# Patient Record
Sex: FEMALE | Race: WHITE
[De-identification: names, ages, dates, MRNs, and addresses within clinical notes are randomized per-mention and may not be internally consistent; named-entity substitution may affect disease eponyms.]

---
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
## 2021-05-18 ENCOUNTER — HOSPITAL ENCOUNTER (OUTPATIENT)
Dept: HOSPITAL 47 - RADXRMAIN | Age: 21
Discharge: HOME | End: 2021-05-18
Attending: NURSE PRACTITIONER
Payer: COMMERCIAL

## 2021-05-18 DIAGNOSIS — M54.9: Primary | ICD-10-CM

## 2021-05-18 PROCEDURE — 72100 X-RAY EXAM L-S SPINE 2/3 VWS: CPT

## 2021-05-18 NOTE — XR
Lumbar spine

 

HISTORY: Back pain

 

3 views lumbar spine, correlation CT scan 4/5/2019

 

Lumbar vertebral bodies show preserved height, alignment, and bone mineralization. Disc spaces are ma
intained. No evident paraspinal mass.

 

IMPRESSION: Normal lumbar spine.

## 2022-08-23 ENCOUNTER — HOSPITAL ENCOUNTER (OUTPATIENT)
Dept: HOSPITAL 47 - FBPOP | Age: 22
Discharge: HOME | End: 2022-08-23
Attending: OBSTETRICS & GYNECOLOGY
Payer: COMMERCIAL

## 2022-08-23 VITALS
TEMPERATURE: 98.8 F | RESPIRATION RATE: 16 BRPM | SYSTOLIC BLOOD PRESSURE: 139 MMHG | HEART RATE: 99 BPM | DIASTOLIC BLOOD PRESSURE: 87 MMHG

## 2022-08-23 DIAGNOSIS — Z3A.34: ICD-10-CM

## 2022-08-23 DIAGNOSIS — O47.03: Primary | ICD-10-CM

## 2022-08-23 LAB
PH UR: 7 [PH] (ref 5–8)
SP GR UR: 1 (ref 1–1.03)
UROBILINOGEN UR QL STRIP: <2 MG/DL (ref ?–2)

## 2022-08-23 PROCEDURE — 99213 OFFICE O/P EST LOW 20 MIN: CPT

## 2022-08-23 PROCEDURE — 59025 FETAL NON-STRESS TEST: CPT

## 2022-08-23 PROCEDURE — 81003 URINALYSIS AUTO W/O SCOPE: CPT

## 2022-08-30 NOTE — P.MSEPDOC
Presenting Problems





- Arrival Data


Date of Arrival on Unit: 22


Time of Arrival on Unit: 11:41


Mode of Transport: Ambulatory





- Complaint


OB-Reason for Admission/Chief Complaint: Other


Comment: Pt reports pink tinged bleeding that started on .





Prenatal Medical History





- Pregnancy Information


: 3


Para: 0


Term: 0


: 0


Abortions: Spontaneous or Elective: 0


Number of Living Children: 0





- Gestational Age


Gestational Age by SAMUEL (wks/days): 34 Weeks and 5 Days





Review of Systems





- Review of Systems


Constitutional: No problems


Breast: No problems


ENT: No problems


Cardiovascular: No problems


Respiratory: No problems


Gastrointestinal: No problems


Genitourinary: No problems


Musculoskeletal: No problems


Neurological: No problems


Skin: No problems





Vital Signs





- Temperature


Temperature: 98.8 F


Temperature Source: Oral





- Pulse


  ** Right Brachial


Pulse Rate: 99


Pulse Assessment Method: Automatic Cuff





- Respirations


Respiratory Rate: 16


Oxygen Delivery Method: Room Air


O2 Sat by Pulse Oximetry: 100





- Blood Pressure


  ** Right Arm


Blood Pressure: 139/87


Blood Pressure Mean: 104


Blood Pressure Source: Automatic Cuff





Medical Screen Scoring





- Fetal Assessment - Baby A


Baseline FHR: 155


Fetal Heart Rate - NICHD Category: Category I (Normal)


NST: Reactive





Physician Notification





- Physician Notified


Physician Notified Date: 22


Physician Notified Time: 12:48


Physician: Irene Lee


New Order Received: Yes





- Notification Comment


Comment: Dr. Lee called and given report on pt. Pt c/o of bleeding. Pt of Dr. Gates.  out of [place]. Light brown tinged noted per RN on toilet paper. 

Reactive NST. No contractions noted. BPs of 139/87, 137/89, 125/86, pulse 

bpm. Pt reports heart arrythmia at 17yrs old. Pt reports mild cramping pain 2 

out of 10. Pt reports next apt on . Orders recieved to send U/A, and to 

orally hydrate pt. To call with result.Dr. Lee in dept and reviewed strip. Pt 

pulse has decreased to below 100bpm.Orders recieved to d/c pt to home if U/A 

results come back negative. To instruct pt to refrain from intercourse until 

next dr yen and to follow up with Dr. Gates.





Maternal Fetal Triage Index





- Urgent/Priority 2


Urgent Priority 2: Yes


Provider Notified: Irene Lee


Provider Notified Time: 11:48


Criteria Met for Priority 2: Spotting.





Disposition





- Disposition


OB Disposition: Physician follow up in office, Discharge to home


Discharge Date: 22


Discharge Time: 13:10


I agree with the RN Medical Screening Exam: Yes


Case reviewed; plan agreed upon as documented in EMR&OBIX.: Yes


Diagnosis: FALSE LABOR BEFORE 37 COMPLETED WEEKS OF GEST, THIRD TRI